# Patient Record
Sex: FEMALE | Race: WHITE | URBAN - METROPOLITAN AREA
[De-identification: names, ages, dates, MRNs, and addresses within clinical notes are randomized per-mention and may not be internally consistent; named-entity substitution may affect disease eponyms.]

---

## 2017-01-05 ENCOUNTER — TRANSFERRED RECORDS (OUTPATIENT)
Dept: HEALTH INFORMATION MANAGEMENT | Facility: CLINIC | Age: 62
End: 2017-01-05

## 2017-04-04 ENCOUNTER — OFFICE VISIT (OUTPATIENT)
Dept: GASTROENTEROLOGY | Facility: CLINIC | Age: 62
End: 2017-04-04
Payer: COMMERCIAL

## 2017-04-04 VITALS
DIASTOLIC BLOOD PRESSURE: 63 MMHG | HEART RATE: 86 BPM | SYSTOLIC BLOOD PRESSURE: 100 MMHG | TEMPERATURE: 97.2 F | OXYGEN SATURATION: 95 % | WEIGHT: 133 LBS

## 2017-04-04 DIAGNOSIS — R19.7 DIARRHEA, UNSPECIFIED TYPE: Primary | ICD-10-CM

## 2017-04-04 PROCEDURE — 99205 OFFICE O/P NEW HI 60 MIN: CPT | Performed by: INTERNAL MEDICINE

## 2017-04-04 RX ORDER — ALBUTEROL SULFATE 90 UG/1
2 AEROSOL, METERED RESPIRATORY (INHALATION)
COMMUNITY
Start: 2017-01-02

## 2017-04-04 RX ORDER — ONDANSETRON 4 MG/1
4 TABLET, ORALLY DISINTEGRATING ORAL
COMMUNITY
Start: 2016-05-11

## 2017-04-04 RX ORDER — CARVEDILOL 6.25 MG/1
6.25 TABLET ORAL
COMMUNITY
Start: 2016-11-14

## 2017-04-04 RX ORDER — LISINOPRIL 5 MG/1
5 TABLET ORAL
COMMUNITY
Start: 2017-01-13

## 2017-04-04 RX ORDER — PAROXETINE 30 MG/1
60 TABLET, FILM COATED ORAL
COMMUNITY
Start: 2017-02-03

## 2017-04-04 RX ORDER — FAMOTIDINE 20 MG/1
20 TABLET, FILM COATED ORAL
COMMUNITY
Start: 2016-05-07

## 2017-04-04 RX ORDER — FUROSEMIDE 40 MG
TABLET ORAL
COMMUNITY
Start: 2016-10-26

## 2017-04-04 RX ORDER — PHENAZOPYRIDINE HYDROCHLORIDE 200 MG/1
200 TABLET, FILM COATED ORAL
COMMUNITY
Start: 2017-03-20

## 2017-04-04 RX ORDER — ASPIRIN 81 MG/1
81 TABLET ORAL
COMMUNITY
Start: 2017-03-17

## 2017-04-04 RX ORDER — OXYCODONE AND ACETAMINOPHEN 5; 325 MG/1; MG/1
1-2 TABLET ORAL
COMMUNITY
Start: 2017-02-28

## 2017-04-04 RX ORDER — KETOROLAC TROMETHAMINE 10 MG/1
10 TABLET, FILM COATED ORAL
COMMUNITY
Start: 2017-03-01

## 2017-04-04 RX ORDER — POTASSIUM CITRATE 10 MEQ/1
20 TABLET, EXTENDED RELEASE ORAL
COMMUNITY
Start: 2016-11-17

## 2017-04-04 RX ORDER — CYCLOBENZAPRINE HCL 5 MG
5 TABLET ORAL
COMMUNITY
Start: 2017-03-13

## 2017-04-04 RX ORDER — ROSUVASTATIN CALCIUM 10 MG/1
10 TABLET, COATED ORAL
COMMUNITY
Start: 2017-01-11

## 2017-04-04 RX ORDER — BUPROPION HYDROCHLORIDE 150 MG/1
150 TABLET ORAL
COMMUNITY
Start: 2017-01-30

## 2017-04-04 RX ORDER — LORAZEPAM 0.5 MG/1
0.5 TABLET ORAL
COMMUNITY
Start: 2017-02-10

## 2017-04-04 RX ORDER — TAMSULOSIN HYDROCHLORIDE 0.4 MG/1
0.4 CAPSULE ORAL
COMMUNITY
Start: 2017-03-01

## 2017-04-04 RX ORDER — OXYBUTYNIN CHLORIDE 5 MG/1
5 TABLET, EXTENDED RELEASE ORAL
COMMUNITY
Start: 2017-03-20

## 2017-04-04 RX ORDER — POTASSIUM CHLORIDE 1500 MG/1
TABLET, EXTENDED RELEASE ORAL
COMMUNITY
Start: 2017-02-20

## 2017-04-04 ASSESSMENT — PAIN SCALES - GENERAL: PAINLEVEL: NO PAIN (0)

## 2017-04-04 NOTE — MR AVS SNAPSHOT
After Visit Summary   4/4/2017    Dominga Echols    MRN: [id number]           Patient Information     Date Of Birth          1955        Visit Information        Provider Department      4/4/2017 3:00 PM Guillermo Elizabeth MD [facility]        Today's Diagnoses     Diarrhea, unspecified type    -  1       Follow-ups after your visit        Future tests that were ordered for you today     Open Standing Orders        Priority Remaining Interval Expires Ordered    Clostridium difficile toxin B PCR Routine 1/1 4/4/2018 4/4/2017    Pancreatic Elastase Fecal Routine 1/1 4/4/2018 4/4/2017    Calprotectin Fecal Routine 1/1 4/4/2018 4/4/2017    Fat Stool Qual Random Collection Routine 1/1 4/4/2018 4/4/2017    Ova and Parasite Exam Routine Routine 1/1 4/4/2018 4/4/2017            Who to contact     If you have questions or need follow up information about today's clinic visit or your schedule please contact [facility] directly at [phone].  Normal or non-critical lab and imaging results will be communicated to you by MyChart, letter or phone within 4 business days after the clinic has received the results. If you do not hear from us within 7 days, please contact the clinic through [employer] or phone. If you have a critical or abnormal lab result, we will notify you by phone as soon as possible.  Submit refill requests through [employer] or call your pharmacy and they will forward the refill request to us. Please allow 3 business days for your refill to be completed.          Additional Information About Your Visit        [employer] Information     [employer] is an electronic gateway that provides easy, online access to your medical records. With [employer], you can request a clinic appointment, read your test results, renew a prescription or communicate with your care team.     To sign up for [employer] visit the website at [email or web address]   You will be asked  to enter the access code listed below, as well as some personal information. Please follow the directions to create your username and password.     Your access code is: [email or web address]  Expires: 7/3/2017  5:17 PM     Your access code will  in 90 days. If you need help or a new code, please contact your [facility] or call [phone] for assistance.        Care EveryWhere ID     This is your Care EveryWhere ID. This could be used by other organizations to access your [facility] medical records  [id number]        Your Vitals Were     Pulse Temperature Pulse Oximetry             86 97.2  F (36.2  C) (Oral) 95%          Blood Pressure from Last 3 Encounters:   17 100/63    Weight from Last 3 Encounters:   17 60.3 kg (133 lb)               Primary Care Provider Office Phone # Fax #    Michelle Arredondo [phone]       [facility] [address] [address]        Thank you!     Thank you for choosing [facility]  for your care. Our goal is always to provide you with excellent care. Hearing back from our patients is one way we can continue to improve our services. Please take a few minutes to complete the written survey that you may receive in the mail after your visit with us. Thank you!             Your Updated Medication List - Protect others around you: Learn how to safely use, store and throw away your medicines at www.disposemymeds.org.          This list is accurate as of: 17  5:17 PM.  Always use your most recent med list.                   Brand Name Dispense Instructions for use    ADVAIR DISKUS 250-50 MCG/DOSE diskus inhaler   Generic drug:  fluticasone-salmeterol      Inhale 1 puff into the lungs       albuterol 108 (90 BASE) MCG/ACT Inhaler    PROAIR HFA/PROVENTIL HFA/VENTOLIN HFA     Inhale 2 puffs into the lungs       aspirin EC 81 MG EC tablet      Take 81 mg by mouth       buPROPion 150 MG 24 hr  tablet    WELLBUTRIN XL     Take 150 mg by mouth       carvedilol 6.25 MG tablet    COREG     Take 6.25 mg by mouth       cyclobenzaprine 5 MG tablet    FLEXERIL     Take 5 mg by mouth       famotidine 20 MG tablet    PEPCID     Take 20 mg by mouth       fluocin-hydroquinone-tretinoin 0.01-4-0.05 % Crea      Apply nightly for 3 -6 months to lighten dark spots.       furosemide 40 MG tablet    LASIX     TK 1 T PO QAM       ketorolac 10 MG tablet    TORADOL     Take 10 mg by mouth       lisinopril 5 MG tablet    PRINIVIL/ZESTRIL     Take 5 mg by mouth       LORazepam 0.5 MG tablet    ATIVAN     Take 0.5 mg by mouth       Magnesium Oxide 250 MG Tabs      Take 250 mg by mouth       ondansetron 4 MG ODT tab    ZOFRAN-ODT     Place 4 mg under the tongue       oxybutynin 5 MG 24 hr tablet    DITROPAN-XL     Take 5 mg by mouth       oxyCODONE-acetaminophen 5-325 MG per tablet    PERCOCET     Take 1-2 tablets by mouth       PARoxetine 30 MG tablet    PAXIL     Take 60 mg by mouth       phenazopyridine 200 MG tablet    PYRIDIUM     Take 200 mg by mouth       potassium chloride SA 20 MEQ CR tablet    K-DUR/KLOR-CON M     TK 1 T PO QD WITH A MEAL       potassium citrate 10 MEQ (1080 MG) CR tablet    UROCIT-K     20 mEq       rosuvastatin 10 MG tablet    CRESTOR     Take 10 mg by mouth       tamsulosin 0.4 MG capsule    FLOMAX     Take 0.4 mg by mouth

## 2017-04-04 NOTE — NURSING NOTE
Dominga Echols's goals for this visit include:   Chief Complaint   Patient presents with     Consult     C-diff       She requests these members of her care team be copied on today's visit information: YES    Referring Provider:  No referring provider defined for this encounter.    Initial /63 (BP Location: Left arm, Patient Position: Chair, Cuff Size: Adult Regular)  Pulse 86  Temp 97.2  F (36.2  C) (Oral)  Wt 60.3 kg (133 lb)  SpO2 95% There is no height or weight on file to calculate BMI.  BP completed using cuff size: small regular

## 2017-04-05 NOTE — PROGRESS NOTES
HISTORY OF PRESENT ILLNESS:  Dominga Echols was referred by Deborah Hdz, she is with [facility], for recurrent C. difficile infection.  The patient was seen at [facility] in 06/2016 however, she came in with history of diarrhea, extending over several years, she says about 4 years.  Her baseline was constipation.  She does not quite remember how this all got changed.  She did have a colonoscopy in 2014 for this diarrhea workup, but they were not able to get records of the biopsies from that time.  The patient at that visit was describing loose stools, 15 or more episodes per day associated with nocturnal bowel movements and fecal urgency and occasional incontinence.  She did have a C. difficile test that was positive in April.  There is a note in her chart that she had been treated with cefuroxime earlier in April for a bladder infection having reported 2 days of dysuria and documented bladder infection.  She had been treated with vancomycin, which at first helped but she says before she even finished the medication symptoms returned back to the preceding ones.  She was documented to have a relapse and was prescribed another course of vancomycin and the same thing happened.  Then, she had another relapse and she was prescribed Dificid and once again the same happened that even before she finished the Dificid course the symptoms were returning.  Symptoms of note are variable, she has 5-15 bowel movements per day without much cramping and some nocturnal bowel movements.  She did have another colonoscopy at [facility] and that showed lymphocytic colitis on biopsies.  She was tried on Entocort, but that did not seem to help her.  She has not lost any weight, which she finds surprising as she feels that she is eating less.      PAST MEDICAL HISTORY:  Notable for a mastectomy due to a malfunction of the implants, salpingo-oophorectomy.  She has some history of pancreatic  cysts.  She had 2 strokes, history of anxiety disorder.  She is on H2 blocker and Crestor among her other medications.  She has not had other abdominal surgeries besides the ones that were already mentioned.  She does not smoke or drink alcohol.  Her stools are generally type 6 on the [place] Stool Scale.      PHYSICAL EXAMINATION:   GENERAL:  She is in no acute distress.   VITAL SIGNS:  Stable.      ASSESSMENT AND PLAN:  The etiology of her diarrhea is unclear at this time.  Her symptoms have not progressed since she has finished Dificid some 3 months ago, but she continues to have the same level of diarrhea, 5-15 bowel movements per day including nocturnal bowel movements.  The nocturnal bowel movements argue against the irritable bowel syndrome as an explanation, but otherwise the differential diagnosis is broad.  It could be lymphocytic colitis, it could still be C. difficile infection and could be something else like malabsorption.  Therefore, we are going to do some exploratory stool testing including C. difficile testing, fecal calprotectin, a screen for malabsorption with qualitative fecal fat and pancreatic elastase.  Further plans will follow completion of these tests.  We briefly touched on fecal microbiota transplant.  It certainly is plausible that she may have symptoms from C. difficile infection, but also has diarrhea caused by antibiotics when it is treated and she cannot tell difference.  That complex scenario that is relatively unlikely but plausible.      TOTAL TIME SPENT:  Total time spent in face-to-face consultation was 1 hour, over 50% was spent counseling and coordinating care.         ENEDINA BRIDGES MD             D: 2017 17:17   T: 2017 04:38   MT: AVIS#[id number]      Name:     DUDLEY INMAN   MRN:      [id number]        Account:      [id number]   :      1955           Visit Date:   2017      Document: [id number]